# Patient Record
Sex: MALE | Race: WHITE | NOT HISPANIC OR LATINO | URBAN - METROPOLITAN AREA
[De-identification: names, ages, dates, MRNs, and addresses within clinical notes are randomized per-mention and may not be internally consistent; named-entity substitution may affect disease eponyms.]

---

## 2019-04-16 ENCOUNTER — APPOINTMENT (RX ONLY)
Dept: URBAN - METROPOLITAN AREA CLINIC 162 | Facility: CLINIC | Age: 84
Setting detail: DERMATOLOGY
End: 2019-04-16

## 2019-04-16 DIAGNOSIS — Z85.828 PERSONAL HISTORY OF OTHER MALIGNANT NEOPLASM OF SKIN: ICD-10-CM

## 2019-04-16 DIAGNOSIS — L81.4 OTHER MELANIN HYPERPIGMENTATION: ICD-10-CM

## 2019-04-16 PROBLEM — I63.50 CEREBRAL INFARCTION DUE TO UNSPECIFIED OCCLUSION OR STENOSIS OF UNSPECIFIED CEREBRAL ARTERY: Status: ACTIVE | Noted: 2019-04-16

## 2019-04-16 PROBLEM — L55.1 SUNBURN OF SECOND DEGREE: Status: ACTIVE | Noted: 2019-04-16

## 2019-04-16 PROBLEM — L57.0 ACTINIC KERATOSIS: Status: ACTIVE | Noted: 2019-04-16

## 2019-04-16 PROBLEM — E78.5 HYPERLIPIDEMIA, UNSPECIFIED: Status: ACTIVE | Noted: 2019-04-16

## 2019-04-16 PROBLEM — E03.9 HYPOTHYROIDISM, UNSPECIFIED: Status: ACTIVE | Noted: 2019-04-16

## 2019-04-16 PROBLEM — Z85.46 PERSONAL HISTORY OF MALIGNANT NEOPLASM OF PROSTATE: Status: ACTIVE | Noted: 2019-04-16

## 2019-04-16 PROCEDURE — ? INVENTORY

## 2019-04-16 PROCEDURE — ? COUNSELING

## 2019-04-16 PROCEDURE — 99214 OFFICE O/P EST MOD 30 MIN: CPT

## 2019-10-07 ENCOUNTER — APPOINTMENT (RX ONLY)
Dept: URBAN - METROPOLITAN AREA CLINIC 162 | Facility: CLINIC | Age: 84
Setting detail: DERMATOLOGY
End: 2019-10-07

## 2019-10-07 DIAGNOSIS — L57.0 ACTINIC KERATOSIS: ICD-10-CM

## 2019-10-07 DIAGNOSIS — D18.0 HEMANGIOMA: ICD-10-CM

## 2019-10-07 DIAGNOSIS — L81.4 OTHER MELANIN HYPERPIGMENTATION: ICD-10-CM

## 2019-10-07 DIAGNOSIS — Z85.828 PERSONAL HISTORY OF OTHER MALIGNANT NEOPLASM OF SKIN: ICD-10-CM

## 2019-10-07 DIAGNOSIS — L82.1 OTHER SEBORRHEIC KERATOSIS: ICD-10-CM

## 2019-10-07 DIAGNOSIS — L57.8 OTHER SKIN CHANGES DUE TO CHRONIC EXPOSURE TO NONIONIZING RADIATION: ICD-10-CM

## 2019-10-07 PROBLEM — D18.01 HEMANGIOMA OF SKIN AND SUBCUTANEOUS TISSUE: Status: ACTIVE | Noted: 2019-10-07

## 2019-10-07 PROCEDURE — 17000 DESTRUCT PREMALG LESION: CPT

## 2019-10-07 PROCEDURE — ? COUNSELING

## 2019-10-07 PROCEDURE — 99213 OFFICE O/P EST LOW 20 MIN: CPT | Mod: 25

## 2019-10-07 PROCEDURE — 17003 DESTRUCT PREMALG LES 2-14: CPT

## 2019-10-07 PROCEDURE — ? FULL BODY SKIN EXAM

## 2019-10-07 PROCEDURE — ? LIQUID NITROGEN

## 2019-10-07 ASSESSMENT — LOCATION ZONE DERM
LOCATION ZONE: EAR
LOCATION ZONE: SCALP
LOCATION ZONE: LEG
LOCATION ZONE: TRUNK

## 2019-10-07 ASSESSMENT — LOCATION SIMPLE DESCRIPTION DERM
LOCATION SIMPLE: ABDOMEN
LOCATION SIMPLE: LEFT THIGH
LOCATION SIMPLE: CHEST
LOCATION SIMPLE: RIGHT THIGH
LOCATION SIMPLE: LEFT EAR
LOCATION SIMPLE: LEFT UPPER BACK
LOCATION SIMPLE: UPPER BACK
LOCATION SIMPLE: POSTERIOR SCALP

## 2019-10-07 ASSESSMENT — LOCATION DETAILED DESCRIPTION DERM
LOCATION DETAILED: INFERIOR THORACIC SPINE
LOCATION DETAILED: RIGHT ANTERIOR PROXIMAL THIGH
LOCATION DETAILED: LEFT SUPERIOR CRUS OF ANTIHELIX
LOCATION DETAILED: LEFT MEDIAL SUPERIOR CHEST
LOCATION DETAILED: LEFT ANTERIOR DISTAL THIGH
LOCATION DETAILED: EPIGASTRIC SKIN
LOCATION DETAILED: RIGHT ANTERIOR DISTAL THIGH
LOCATION DETAILED: LEFT INFERIOR MEDIAL UPPER BACK
LOCATION DETAILED: MID-OCCIPITAL SCALP

## 2019-10-07 NOTE — HPI: SKIN LESION
How Severe Is Your Skin Lesion?: moderate
Otitis Media in Children   AMBULATORY CARE:   Otitis media  is an infection in one or both ears  Children are most likely to get ear infections when they are between 6 months and 1years old  Ear infections are most common during the winter and early spring months, but can happen any time during the year  Your child may have an ear infection more than once  Common symptoms include the following:   · Fever     · Ear pain or tugging, pulling, or rubbing of the ear    · Decreased appetite from painful sucking, swallowing, or chewing    · Fussiness, restlessness, or difficulty sleeping    · Yellow fluid or pus coming from the ear    · Difficulty hearing    · Dizziness or loss of balance  Seek care immediately if:   · You see blood or pus draining from your child's ear  · Your child seems confused or cannot stay awake  · Your child has a stiff neck, headache, and a fever  Contact your child's healthcare provider if:   · Your child has a fever  · Your child is still not eating or drinking 24 hours after he takes his medicine  · Your child has pain behind his ear or when you move his earlobe  · Your child's ear is sticking out from his head  · Your child still has signs and symptoms of an ear infection 48 hours after he takes his medicine  · You have questions or concerns about your child's condition or care  Treatment for otitis media  may include medicines to decrease your child's pain or fever or medicine to treat an infection caused by bacteria  Ear tubes may be used to keep fluid from collecting in your child's ears  Your child may need these to help prevent frequent ear infections or hearing loss  During this procedure, the healthcare provider will cut a small hole in your child's eardrum  Care for your child at home:   · Prop your child's head and chest up  while he sleeps  This may decrease his ear pressure and pain   Ask your child's healthcare provider how to safely prop your child's
Has Your Skin Lesion Been Treated?: not been treated
head and chest up  · Have your child lie with his infected ear facing down  to allow excess fluid to drain from his ear  · Use ice or heat  to help decrease your child's ear pain  Ask which of these is best for your child, and use as directed  · Ask about ways to keep water out of your child's ears  when he bathes or swims  Prevent otitis media:   · Wash your and your child's hands often  to help prevent the spread of germs  Encourage everyone in your house to wash their hands with soap and water after they use the bathroom, change a diaper, and before they prepare or eat food  · Keep your child away from people who are ill, such as sick playmates  Germs spread easily and quickly in  centers  · If possible, breastfeed your baby  Your baby may be less likely to get an ear infection if he is   · Do not give your child a bottle while he is lying down  This may cause liquid from his sinuses to leak into his eustachian tube  · Keep your child away from people who smoke  · Vaccinate your child  Ask your child's healthcare provider about the shots your child needs  Follow up with your healthcare provider as directed:  Write down your questions so you remember to ask them during your visits  © 2017 [employer] Information is for End User's use only and may not be sold, redistributed or otherwise used for commercial purposes  All illustrations and images included in CareNotes® are the copyrighted property of [employer]  or [employer]  The above information is an  only  It is not intended as medical advice for individual conditions or treatments  Talk to your doctor, nurse or pharmacist before following any medical regimen to see if it is safe and effective for you 
Is This A New Presentation, Or A Follow-Up?: Skin Lesions